# Patient Record
Sex: MALE | Race: WHITE | NOT HISPANIC OR LATINO | Employment: FULL TIME | URBAN - METROPOLITAN AREA
[De-identification: names, ages, dates, MRNs, and addresses within clinical notes are randomized per-mention and may not be internally consistent; named-entity substitution may affect disease eponyms.]

---

## 2023-08-15 ENCOUNTER — OFFICE VISIT (OUTPATIENT)
Dept: PEDIATRICS | Facility: CLINIC | Age: 18
End: 2023-08-15
Payer: COMMERCIAL

## 2023-08-15 VITALS — BODY MASS INDEX: 36.75 KG/M2 | HEART RATE: 76 BPM | RESPIRATION RATE: 16 BRPM | TEMPERATURE: 96.7 F | WEIGHT: 256.13 LBS

## 2023-08-15 DIAGNOSIS — R29.898 WEAKNESS OF BOTH HANDS: Primary | ICD-10-CM

## 2023-08-15 PROCEDURE — 99214 OFFICE O/P EST MOD 30 MIN: CPT | Performed by: PEDIATRICS

## 2023-08-15 NOTE — PROGRESS NOTES
Subjective   Patient ID: Rio Stone is a 18 y.o. male who presents for Hand Pain.  Patient is present in office with Mom for hand problems,   Several yrs of bilat hand weakness intermittent. Per mom seemed to happen after broken wrists when younger. Occ will drops tools or cups. Has intermittent pain in differnet areas of hands. Today pointing to right radial aspect.    Mom has hand surgeon for carpal tunnel    Hand Pain   Incident onset: 4 yrs ago/ last flared yesterday. The incident occurred at work. The pain is at a severity of 8/10. The pain is moderate. The pain has been Constant since the incident.       Review of Systems    Objective   Physical Exam  Vitals reviewed.   Constitutional:       Appearance: Normal appearance.   Musculoskeletal:      Comments: Diminished  strength on right in right handed person.   Neurological:      Mental Status: He is alert.      Comments: Unable to detect light and sharp sensation on left thumb and left radial aspect forearm         Assessment/Plan   Diagnoses and all orders for this visit:  Weakness of both hands  -     Referral to Orthopaedic Surgery; Future    May take motin for pain and consider ice on wrists as needed    Call if worsens
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
## 2023-10-10 PROBLEM — R41.840 POOR CONCENTRATION: Status: ACTIVE | Noted: 2023-10-10

## 2023-10-10 PROBLEM — R63.5 WEIGHT GAIN: Status: ACTIVE | Noted: 2023-10-10

## 2023-10-10 PROBLEM — F81.9 LEARNING DISABILITY: Status: ACTIVE | Noted: 2017-04-03

## 2023-10-10 PROBLEM — F98.8 ATTENTION DEFICIT DISORDER WITHOUT HYPERACTIVITY: Status: ACTIVE | Noted: 2023-10-10

## 2023-10-10 PROBLEM — G43.101 MIGRAINE WITH AURA AND WITH STATUS MIGRAINOSUS, NOT INTRACTABLE: Status: ACTIVE | Noted: 2019-07-11

## 2023-10-10 PROBLEM — G56.02 CARPAL TUNNEL SYNDROME OF LEFT WRIST: Status: ACTIVE | Noted: 2023-10-10

## 2023-10-10 PROBLEM — S40.012A CONTUSION OF LEFT SHOULDER: Status: ACTIVE | Noted: 2023-10-10

## 2023-10-10 PROBLEM — L30.9 ECZEMA: Status: ACTIVE | Noted: 2023-10-10

## 2023-10-10 PROBLEM — G43.009 MIGRAINE WITHOUT AURA AND WITHOUT STATUS MIGRAINOSUS, NOT INTRACTABLE: Status: ACTIVE | Noted: 2023-10-10

## 2023-10-10 PROBLEM — Z15.89: Status: ACTIVE | Noted: 2017-04-03

## 2023-10-10 PROBLEM — F81.9 LEARNING DIFFICULTY: Status: ACTIVE | Noted: 2023-10-10

## 2023-10-10 PROBLEM — L85.8 OTHER SPECIFIED EPIDERMAL THICKENING: Status: ACTIVE | Noted: 2018-01-24

## 2023-10-10 PROBLEM — G91.9 HYDROCEPHALUS (MULTI): Status: ACTIVE | Noted: 2023-10-10

## 2023-10-10 PROBLEM — L85.3 XEROSIS CUTIS: Status: ACTIVE | Noted: 2018-01-24

## 2023-10-10 PROBLEM — G93.89 CEREBRAL VENTRICULOMEGALY: Status: ACTIVE | Noted: 2023-10-10

## 2023-10-10 PROBLEM — G56.01 CARPAL TUNNEL SYNDROME OF RIGHT WRIST: Status: ACTIVE | Noted: 2023-10-10

## 2023-10-10 PROBLEM — T50.905A MEDICATION REACTION: Status: ACTIVE | Noted: 2023-10-10

## 2023-10-10 PROBLEM — F41.9 ANXIETY: Status: ACTIVE | Noted: 2019-12-11

## 2023-10-10 PROBLEM — R51.9 CHRONIC DAILY HEADACHE: Status: ACTIVE | Noted: 2019-07-11

## 2023-10-10 RX ORDER — EPINEPHRINE 0.3 MG/.3ML
0.3 INJECTION SUBCUTANEOUS ONCE AS NEEDED
COMMUNITY
Start: 2016-07-13 | End: 2024-03-14 | Stop reason: SDUPTHER

## 2023-10-10 RX ORDER — AMMONIUM LACTATE 12 G/100G
1 LOTION TOPICAL
COMMUNITY
Start: 2018-01-24

## 2023-10-10 RX ORDER — ONDANSETRON HYDROCHLORIDE 8 MG/1
1 TABLET, FILM COATED ORAL EVERY 8 HOURS PRN
COMMUNITY
Start: 2020-04-28

## 2023-10-10 RX ORDER — SUMATRIPTAN 50 MG/1
1 TABLET, FILM COATED ORAL AS NEEDED
COMMUNITY
Start: 2020-04-28

## 2023-10-10 RX ORDER — DULOXETIN HYDROCHLORIDE 20 MG/1
1 CAPSULE, DELAYED RELEASE ORAL DAILY
COMMUNITY
Start: 2020-08-06

## 2023-10-11 ENCOUNTER — OFFICE VISIT (OUTPATIENT)
Dept: ORTHOPEDIC SURGERY | Facility: CLINIC | Age: 18
End: 2023-10-11
Payer: COMMERCIAL

## 2023-10-11 VITALS — HEIGHT: 70 IN | BODY MASS INDEX: 36.65 KG/M2 | WEIGHT: 256 LBS

## 2023-10-11 DIAGNOSIS — G56.02 CARPAL TUNNEL SYNDROME OF LEFT WRIST: Primary | ICD-10-CM

## 2023-10-11 DIAGNOSIS — G56.01 CARPAL TUNNEL SYNDROME OF RIGHT WRIST: ICD-10-CM

## 2023-10-11 PROCEDURE — 99214 OFFICE O/P EST MOD 30 MIN: CPT | Performed by: ORTHOPAEDIC SURGERY

## 2023-10-11 NOTE — PROGRESS NOTES
DEVONTE RAMSAY is a 18 year male who follows up today with bilateral hand pain, numbness, tingling, weakness. Patient reports dropping things lately and getting pain and numbness in fingers. Reports no past surgeries, injections, or trauma to the area. Pain worse with use, better with rest. Pain numb and tingly. Works as a . Got EMG.  Left worse than right.  Drops a lot of tools at work.  Worse first thing in the morning.      Review of Systems    Constitutional: no fever, no chills, not feeling tired, no recent weight gain and no recent weight loss.   ENT: no nosebleeds.   Cardiovascular: no chest pain.   Respiratory: no shortness of breath and no cough.   Gastrointestinal: no abdominal pain, no nausea, no vomiting and no diarrhea.   Musculoskeletal: per HPI  Integumentary: no rashes and no skin wound.   Neurological: no headache.   Psychiatric: no depression and no sleep disturbances.   Endocrine: no muscle weakness and no muscle cramps.   Hematologic/Lymphatic: no swollen glands and no tendency for easy bruising.     All other systems have been reviewed and are negative for complaint    Patient's past medical history, past surgical history, allergies, and medications have been reviewed unless otherwise noted in the chart.     Carpal Tunnel Exam  Inspection:  no evidence of infection, no edema, no erythema, no ecchymosis, Palpation:  compartments are soft, no pain with palpation, Range of Motion:  full wrist and finger range of motion, Stability:  no wrist instability detected, Strength:  5/5 APB and intrinsics, Skin:  intact, Vascular:  capillary refill <2 seconds distally, Sensation:  decreased in the median nerve distribution, Test:  negative Tinel's at the Carpal Tunnel, positive Direct Carpal Tunnel Compression Test      Constitutional   General appearance: Alert and in no acute distress. Well developed, well nourished.    Eyes   External Eye, Conjunctiva and lids: Normal external exam - pupils were  equal in size, round, reactive to light (PERRL) with normal accommodation and extraocular movements intact (EOMI).   Ears, Nose, Mouth, and Throat   Hearing: Normal.   Neck   Neck: No neck mass was observed. Supple.   Pulmonary   Respiratory effort: No respiratory distress.   Cardiovascular   Examination of extremities: No peripheral edema.   Abdomen   Abdomen: Soft nontender; no abdominal mass palpated.. No rebound, rigidity or guarding.    Skin   Skin and subcutaneous tissue: Normal skin color and pigmentation, normal skin turgor, and no rash.   Neurologic   Sensation: Normal.   Psychiatric   Judgment and insight: Intact.   Orientation to person, place, and time: Alert and oriented x 3.       Mood and affect: Normal.      EMG - borderline right CTS, no left     Bilateral CTS  I discussed the diagnosis and treatment options with the patient today along with their associated risks and benefits. After thorough discussion, the patient has elected to proceed with conservative management. All questions were answered to the patients satisfaction who seems satisfied with the plan.      Discussion I discussed the diagnosis and treatment options with the patient today along with their associated risks and benefits. After thorough discussion, the patient has elected to proceed with a corticosteroid injection with Kenalog and Xylocaine, which was performed in the office today under aseptic technique and the patient tolerated the procedure well.          Ortho Injections:           Injection site left carpal tunnel. Medication 1 cc 1% Xylocaine, 1 cc 10 mg Kenalog, Injection given under sterile conditions. No immediate complications noted. Post injection instructions given.  FU 8 weeks prn - NO XR

## 2024-02-20 ENCOUNTER — HOSPITAL ENCOUNTER (EMERGENCY)
Facility: HOSPITAL | Age: 19
Discharge: HOME | End: 2024-02-20
Payer: COMMERCIAL

## 2024-02-20 VITALS
BODY MASS INDEX: 33.86 KG/M2 | SYSTOLIC BLOOD PRESSURE: 144 MMHG | TEMPERATURE: 97.6 F | OXYGEN SATURATION: 96 % | RESPIRATION RATE: 16 BRPM | HEIGHT: 72 IN | HEART RATE: 92 BPM | WEIGHT: 250 LBS | DIASTOLIC BLOOD PRESSURE: 80 MMHG

## 2024-02-20 PROCEDURE — 4500999001 HC ED NO CHARGE

## 2024-02-20 ASSESSMENT — PAIN SCALES - GENERAL: PAINLEVEL_OUTOF10: 7

## 2024-02-20 ASSESSMENT — PAIN DESCRIPTION - ORIENTATION: ORIENTATION: LEFT

## 2024-02-20 ASSESSMENT — PAIN - FUNCTIONAL ASSESSMENT: PAIN_FUNCTIONAL_ASSESSMENT: 0-10

## 2024-02-20 ASSESSMENT — PAIN DESCRIPTION - LOCATION: LOCATION: ARM

## 2024-03-11 ENCOUNTER — PREP FOR PROCEDURE (OUTPATIENT)
Dept: ORTHOPEDIC SURGERY | Facility: CLINIC | Age: 19
End: 2024-03-11

## 2024-03-11 ENCOUNTER — OFFICE VISIT (OUTPATIENT)
Dept: ORTHOPEDIC SURGERY | Facility: CLINIC | Age: 19
End: 2024-03-11
Payer: COMMERCIAL

## 2024-03-11 DIAGNOSIS — G56.02 CARPAL TUNNEL SYNDROME OF LEFT WRIST: Primary | ICD-10-CM

## 2024-03-11 DIAGNOSIS — G56.01 CARPAL TUNNEL SYNDROME OF RIGHT WRIST: ICD-10-CM

## 2024-03-11 DIAGNOSIS — G56.02 CARPAL TUNNEL SYNDROME OF LEFT WRIST: ICD-10-CM

## 2024-03-11 PROCEDURE — 99214 OFFICE O/P EST MOD 30 MIN: CPT | Performed by: ORTHOPAEDIC SURGERY

## 2024-03-11 NOTE — H&P (VIEW-ONLY)
DEVONTE RAMSAY is a 18 year male who follows up today with bilateral hand pain, numbness, tingling, weakness. Patient reports dropping things lately and getting pain and numbness in fingers. Reports no past surgeries or trauma to the area. Pain worse with use, better with rest. Pain numb and tingly. Works as a . Got EMG.  Left worse than right.  Drops a lot of tools at work.  Worse first thing in the morning.  Had a shot in his left about 5 months ago that gave him relief until about a month ago.  Tried some splints which did not help much .  States he would like surgery.  He would also like to try a shot in his right at the time of surgery    Review of Systems    Constitutional: no fever, no chills, not feeling tired, no recent weight gain and no recent weight loss.   ENT: no nosebleeds.   Cardiovascular: no chest pain.   Respiratory: no shortness of breath and no cough.   Gastrointestinal: no abdominal pain, no nausea, no vomiting and no diarrhea.   Musculoskeletal: per HPI  Integumentary: no rashes and no skin wound.   Neurological: no headache.   Psychiatric: no depression and no sleep disturbances.   Endocrine: no muscle weakness and no muscle cramps.   Hematologic/Lymphatic: no swollen glands and no tendency for easy bruising.     All other systems have been reviewed and are negative for complaint    Patient's past medical history, past surgical history, allergies, and medications have been reviewed unless otherwise noted in the chart.     Carpal Tunnel Exam  Inspection:  no evidence of infection, no edema, no erythema, no ecchymosis, Palpation:  compartments are soft, no pain with palpation, Range of Motion:  full wrist and finger range of motion, Stability:  no wrist instability detected, Strength:  5/5 APB and intrinsics, Skin:  intact, Vascular:  capillary refill <2 seconds distally, Sensation:  decreased in the median nerve distribution, Test:  negative Tinel's at the Carpal Tunnel, positive Direct  Carpal Tunnel Compression Test      Constitutional   General appearance: Alert and in no acute distress. Well developed, well nourished.    Eyes   External Eye, Conjunctiva and lids: Normal external exam - pupils were equal in size, round, reactive to light (PERRL) with normal accommodation and extraocular movements intact (EOMI).   Ears, Nose, Mouth, and Throat   Hearing: Normal.   Neck   Neck: No neck mass was observed. Supple.   Pulmonary   Respiratory effort: No respiratory distress.   Auscultation of lungs: Clear bilateral breath sounds.   Cardiovascular   Examination of extremities: No peripheral edema.   Auscultation of heart: Heart rate and rhythm were normal   Psychiatric   Judgment and insight: Intact.   Orientation to person, place, and time: Alert and oriented x 3.       Mood and affect: Normal.      EMG - borderline right CTS, no left     Bilateral CTS  Surgery discussion: I discussed the diagnosis and treatment options with the patient today along with their associated risks and benefits. After thorough discussion, the patient has elected to proceed with surgical intervention. The patient understands the risks of,including, but not limited to, bleeding, infection, loss of life or limb, pain, permanent numbness, tingling, weakness, loss of motion, failure of the goals of surgery or the potential need for additional surgery. The patient would like to accept these risks and proceed. All questions were answered to the patients satisfaction and the patient seems satisfied with the plan.    Plan left E CTR with right steroid injection carpal tunnel -patient has shown improvement in symptoms with a steroid injection in his left and therefore surgery is a reasonable option and he would like to proceed, braces are not helping him enough anymore  Follow-up 2 weeks after

## 2024-03-11 NOTE — PROGRESS NOTES
Bilateral carpal tunnel 10/11/2023 left injection done, helped for 4 months   Patient would like another injection

## 2024-03-13 ENCOUNTER — TELEMEDICINE CLINICAL SUPPORT (OUTPATIENT)
Dept: PREADMISSION TESTING | Facility: HOSPITAL | Age: 19
End: 2024-03-13
Payer: COMMERCIAL

## 2024-03-13 NOTE — PREPROCEDURE INSTRUCTIONS
No outpatient medications have been marked as taking for the 3/13/24 encounter (Telemedicine Clinical Support) with [facility] ROOM 01.              NPO Instructions:     Do not drink any liquid after midnight the night before your surgery  Do not eat any food after midnight the night before your surgery/procedure.  Candy, gum, mints and smoking of cigarettes, marijuana or vaping is not permitted after midnight prior to your surgery.  Do not chew tobacco morning of surgery.   Do not drink Alcohol 24 hours prior to surgery      Increase fluid intake day before surgery    Additional Instructions:      Review your medication instructions, take indicated medications    Wear  comfortable loose fitting clothing  Do not use moisturizers, creams, lotions or perfume  All jewelry and valuables should be left at home. May bring glasses and partials.    Stop blood thinning medications as instructed by ordering physician or surgeon.  Stop Ibuprofen and Aspirin products 7 days prior to surgery.     Shower or bathe the night before or day of surgery.   Brush teeth and avoid perfumes, colognes, powders, makeup, aftershave and hair spray    Go to Registration, in the main lobby, upon arrival on the day of surgery and have 's license and medical insurance card available.    Call [phone] the day before your surgery/procedure to find out what time you are to arrive the next day.     Please have a responsible adult to drive you home and be available to help you as needed after surgery.         NPO Instructions:    Do not eat any food after midnight the night before your surgery/procedure.

## 2024-03-14 DIAGNOSIS — Z91.030 BEE STING ALLERGY: Primary | ICD-10-CM

## 2024-03-18 RX ORDER — EPINEPHRINE 0.3 MG/.3ML
0.3 INJECTION SUBCUTANEOUS ONCE AS NEEDED
Qty: 2 EACH | Refills: 1 | Status: SHIPPED | OUTPATIENT
Start: 2024-03-18

## 2024-03-20 ENCOUNTER — ANESTHESIA EVENT (OUTPATIENT)
Dept: OPERATING ROOM | Facility: HOSPITAL | Age: 19
End: 2024-03-20
Payer: COMMERCIAL

## 2024-03-26 ENCOUNTER — HOSPITAL ENCOUNTER (OUTPATIENT)
Facility: HOSPITAL | Age: 19
Setting detail: OUTPATIENT SURGERY
Discharge: HOME | End: 2024-03-26
Attending: ORTHOPAEDIC SURGERY | Admitting: ORTHOPAEDIC SURGERY
Payer: COMMERCIAL

## 2024-03-26 ENCOUNTER — PHARMACY VISIT (OUTPATIENT)
Dept: PHARMACY | Facility: CLINIC | Age: 19
End: 2024-03-26
Payer: MEDICAID

## 2024-03-26 ENCOUNTER — ANESTHESIA (OUTPATIENT)
Dept: OPERATING ROOM | Facility: HOSPITAL | Age: 19
End: 2024-03-26
Payer: COMMERCIAL

## 2024-03-26 VITALS
HEART RATE: 79 BPM | SYSTOLIC BLOOD PRESSURE: 110 MMHG | OXYGEN SATURATION: 96 % | DIASTOLIC BLOOD PRESSURE: 61 MMHG | RESPIRATION RATE: 16 BRPM | TEMPERATURE: 98.5 F

## 2024-03-26 DIAGNOSIS — G56.02 CARPAL TUNNEL SYNDROME OF LEFT WRIST: Primary | ICD-10-CM

## 2024-03-26 DIAGNOSIS — G56.01 CARPAL TUNNEL SYNDROME OF RIGHT WRIST: ICD-10-CM

## 2024-03-26 PROCEDURE — 7100000009 HC PHASE TWO TIME - INITIAL BASE CHARGE: Performed by: ORTHOPAEDIC SURGERY

## 2024-03-26 PROCEDURE — 2500000004 HC RX 250 GENERAL PHARMACY W/ HCPCS (ALT 636 FOR OP/ED): Performed by: ANESTHESIOLOGY

## 2024-03-26 PROCEDURE — 3600000008 HC OR TIME - EACH INCREMENTAL 1 MINUTE - PROCEDURE LEVEL THREE: Performed by: ORTHOPAEDIC SURGERY

## 2024-03-26 PROCEDURE — RXMED WILLOW AMBULATORY MEDICATION CHARGE

## 2024-03-26 PROCEDURE — 29848 WRIST ENDOSCOPY/SURGERY: CPT | Performed by: ORTHOPAEDIC SURGERY

## 2024-03-26 PROCEDURE — 20526 THER INJECTION CARP TUNNEL: CPT | Performed by: ORTHOPAEDIC SURGERY

## 2024-03-26 PROCEDURE — 2500000005 HC RX 250 GENERAL PHARMACY W/O HCPCS: Performed by: ORTHOPAEDIC SURGERY

## 2024-03-26 PROCEDURE — 2500000004 HC RX 250 GENERAL PHARMACY W/ HCPCS (ALT 636 FOR OP/ED): Performed by: NURSE ANESTHETIST, CERTIFIED REGISTERED

## 2024-03-26 PROCEDURE — 7100000010 HC PHASE TWO TIME - EACH INCREMENTAL 1 MINUTE: Performed by: ORTHOPAEDIC SURGERY

## 2024-03-26 PROCEDURE — 2720000007 HC OR 272 NO HCPCS: Performed by: ORTHOPAEDIC SURGERY

## 2024-03-26 PROCEDURE — 3600000003 HC OR TIME - INITIAL BASE CHARGE - PROCEDURE LEVEL THREE: Performed by: ORTHOPAEDIC SURGERY

## 2024-03-26 PROCEDURE — 2500000004 HC RX 250 GENERAL PHARMACY W/ HCPCS (ALT 636 FOR OP/ED): Performed by: ORTHOPAEDIC SURGERY

## 2024-03-26 PROCEDURE — 3700000002 HC GENERAL ANESTHESIA TIME - EACH INCREMENTAL 1 MINUTE: Performed by: ORTHOPAEDIC SURGERY

## 2024-03-26 PROCEDURE — 3700000001 HC GENERAL ANESTHESIA TIME - INITIAL BASE CHARGE: Performed by: ORTHOPAEDIC SURGERY

## 2024-03-26 RX ORDER — FENTANYL CITRATE 50 UG/ML
INJECTION, SOLUTION INTRAMUSCULAR; INTRAVENOUS AS NEEDED
Status: DISCONTINUED | OUTPATIENT
Start: 2024-03-26 | End: 2024-03-26

## 2024-03-26 RX ORDER — ONDANSETRON HYDROCHLORIDE 2 MG/ML
INJECTION, SOLUTION INTRAVENOUS AS NEEDED
Status: DISCONTINUED | OUTPATIENT
Start: 2024-03-26 | End: 2024-03-26

## 2024-03-26 RX ORDER — LABETALOL HYDROCHLORIDE 5 MG/ML
5 INJECTION, SOLUTION INTRAVENOUS ONCE AS NEEDED
Status: DISCONTINUED | OUTPATIENT
Start: 2024-03-26 | End: 2024-03-26 | Stop reason: HOSPADM

## 2024-03-26 RX ORDER — LIDOCAINE HYDROCHLORIDE AND EPINEPHRINE 20; 10 MG/ML; UG/ML
INJECTION, SOLUTION INFILTRATION; PERINEURAL AS NEEDED
Status: DISCONTINUED | OUTPATIENT
Start: 2024-03-26 | End: 2024-03-26 | Stop reason: HOSPADM

## 2024-03-26 RX ORDER — LIDOCAINE HYDROCHLORIDE 10 MG/ML
0.1 INJECTION, SOLUTION EPIDURAL; INFILTRATION; INTRACAUDAL; PERINEURAL ONCE
Status: DISCONTINUED | OUTPATIENT
Start: 2024-03-26 | End: 2024-03-26 | Stop reason: HOSPADM

## 2024-03-26 RX ORDER — KETOROLAC TROMETHAMINE 30 MG/ML
INJECTION, SOLUTION INTRAMUSCULAR; INTRAVENOUS AS NEEDED
Status: DISCONTINUED | OUTPATIENT
Start: 2024-03-26 | End: 2024-03-26

## 2024-03-26 RX ORDER — TRIAMCINOLONE ACETONIDE 40 MG/ML
INJECTION, SUSPENSION INTRA-ARTICULAR; INTRAMUSCULAR AS NEEDED
Status: DISCONTINUED | OUTPATIENT
Start: 2024-03-26 | End: 2024-03-26 | Stop reason: HOSPADM

## 2024-03-26 RX ORDER — PROPOFOL 10 MG/ML
INJECTION, EMULSION INTRAVENOUS CONTINUOUS PRN
Status: DISCONTINUED | OUTPATIENT
Start: 2024-03-26 | End: 2024-03-26

## 2024-03-26 RX ORDER — OXYCODONE AND ACETAMINOPHEN 5; 325 MG/1; MG/1
1 TABLET ORAL EVERY 4 HOURS PRN
Status: DISCONTINUED | OUTPATIENT
Start: 2024-03-26 | End: 2024-03-26 | Stop reason: HOSPADM

## 2024-03-26 RX ORDER — SODIUM CHLORIDE, SODIUM LACTATE, POTASSIUM CHLORIDE, CALCIUM CHLORIDE 600; 310; 30; 20 MG/100ML; MG/100ML; MG/100ML; MG/100ML
100 INJECTION, SOLUTION INTRAVENOUS CONTINUOUS
Status: DISCONTINUED | OUTPATIENT
Start: 2024-03-26 | End: 2024-03-26 | Stop reason: HOSPADM

## 2024-03-26 RX ORDER — BUPIVACAINE HCL/EPINEPHRINE 0.5-1:200K
VIAL (ML) INJECTION AS NEEDED
Status: DISCONTINUED | OUTPATIENT
Start: 2024-03-26 | End: 2024-03-26 | Stop reason: HOSPADM

## 2024-03-26 RX ORDER — ALBUTEROL SULFATE 0.83 MG/ML
2.5 SOLUTION RESPIRATORY (INHALATION) ONCE AS NEEDED
Status: DISCONTINUED | OUTPATIENT
Start: 2024-03-26 | End: 2024-03-26 | Stop reason: HOSPADM

## 2024-03-26 RX ORDER — HYDRALAZINE HYDROCHLORIDE 20 MG/ML
5 INJECTION INTRAMUSCULAR; INTRAVENOUS EVERY 30 MIN PRN
Status: DISCONTINUED | OUTPATIENT
Start: 2024-03-26 | End: 2024-03-26 | Stop reason: HOSPADM

## 2024-03-26 RX ORDER — MIDAZOLAM HYDROCHLORIDE 1 MG/ML
INJECTION, SOLUTION INTRAMUSCULAR; INTRAVENOUS AS NEEDED
Status: DISCONTINUED | OUTPATIENT
Start: 2024-03-26 | End: 2024-03-26

## 2024-03-26 RX ORDER — ONDANSETRON HYDROCHLORIDE 2 MG/ML
4 INJECTION, SOLUTION INTRAVENOUS ONCE AS NEEDED
Status: DISCONTINUED | OUTPATIENT
Start: 2024-03-26 | End: 2024-03-26 | Stop reason: HOSPADM

## 2024-03-26 RX ORDER — HYDROCODONE BITARTRATE AND ACETAMINOPHEN 5; 325 MG/1; MG/1
1 TABLET ORAL EVERY 6 HOURS PRN
Qty: 10 TABLET | Refills: 0 | Status: SHIPPED | OUTPATIENT
Start: 2024-03-26

## 2024-03-26 RX ORDER — CEFAZOLIN SODIUM 2 G/100ML
INJECTION, SOLUTION INTRAVENOUS AS NEEDED
Status: DISCONTINUED | OUTPATIENT
Start: 2024-03-26 | End: 2024-03-26

## 2024-03-26 RX ORDER — PROPOFOL 10 MG/ML
INJECTION, EMULSION INTRAVENOUS AS NEEDED
Status: DISCONTINUED | OUTPATIENT
Start: 2024-03-26 | End: 2024-03-26

## 2024-03-26 RX ORDER — MEPERIDINE HYDROCHLORIDE 25 MG/ML
12.5 INJECTION INTRAMUSCULAR; INTRAVENOUS; SUBCUTANEOUS EVERY 10 MIN PRN
Status: DISCONTINUED | OUTPATIENT
Start: 2024-03-26 | End: 2024-03-26 | Stop reason: HOSPADM

## 2024-03-26 RX ORDER — FAMOTIDINE 10 MG/ML
20 INJECTION INTRAVENOUS ONCE
Status: COMPLETED | OUTPATIENT
Start: 2024-03-26 | End: 2024-03-26

## 2024-03-26 RX ORDER — DIPHENHYDRAMINE HYDROCHLORIDE 50 MG/ML
12.5 INJECTION INTRAMUSCULAR; INTRAVENOUS ONCE AS NEEDED
Status: DISCONTINUED | OUTPATIENT
Start: 2024-03-26 | End: 2024-03-26 | Stop reason: HOSPADM

## 2024-03-26 RX ORDER — MORPHINE SULFATE 2 MG/ML
2 INJECTION, SOLUTION INTRAMUSCULAR; INTRAVENOUS EVERY 5 MIN PRN
Status: DISCONTINUED | OUTPATIENT
Start: 2024-03-26 | End: 2024-03-26 | Stop reason: HOSPADM

## 2024-03-26 RX ORDER — DROPERIDOL 2.5 MG/ML
0.62 INJECTION, SOLUTION INTRAMUSCULAR; INTRAVENOUS ONCE AS NEEDED
Status: DISCONTINUED | OUTPATIENT
Start: 2024-03-26 | End: 2024-03-26 | Stop reason: HOSPADM

## 2024-03-26 RX ADMIN — FAMOTIDINE 20 MG: 10 INJECTION, SOLUTION INTRAVENOUS at 08:18

## 2024-03-26 RX ADMIN — PROPOFOL 100 MCG/KG/MIN: 10 INJECTION, EMULSION INTRAVENOUS at 09:42

## 2024-03-26 RX ADMIN — CEFAZOLIN SODIUM 2 G: 2 INJECTION, SOLUTION INTRAVENOUS at 09:39

## 2024-03-26 RX ADMIN — ONDANSETRON 4 MG: 2 INJECTION INTRAMUSCULAR; INTRAVENOUS at 09:54

## 2024-03-26 RX ADMIN — SODIUM CHLORIDE, POTASSIUM CHLORIDE, SODIUM LACTATE AND CALCIUM CHLORIDE 100 ML/HR: 600; 310; 30; 20 INJECTION, SOLUTION INTRAVENOUS at 08:19

## 2024-03-26 RX ADMIN — KETOROLAC TROMETHAMINE 30 MG: 30 INJECTION, SOLUTION INTRAMUSCULAR at 09:53

## 2024-03-26 RX ADMIN — PROPOFOL 50 MG: 10 INJECTION, EMULSION INTRAVENOUS at 09:42

## 2024-03-26 RX ADMIN — FENTANYL CITRATE 50 MCG: 50 INJECTION INTRAMUSCULAR; INTRAVENOUS at 09:39

## 2024-03-26 RX ADMIN — MIDAZOLAM 2 MG: 1 INJECTION INTRAMUSCULAR; INTRAVENOUS at 09:39

## 2024-03-26 RX ADMIN — PROPOFOL 15 MG: 10 INJECTION, EMULSION INTRAVENOUS at 09:43

## 2024-03-26 SDOH — HEALTH STABILITY: MENTAL HEALTH: CURRENT SMOKER: 1

## 2024-03-26 ASSESSMENT — PAIN - FUNCTIONAL ASSESSMENT: PAIN_FUNCTIONAL_ASSESSMENT: 0-10

## 2024-03-26 ASSESSMENT — COLUMBIA-SUICIDE SEVERITY RATING SCALE - C-SSRS
6. HAVE YOU EVER DONE ANYTHING, STARTED TO DO ANYTHING, OR PREPARED TO DO ANYTHING TO END YOUR LIFE?: NO
2. HAVE YOU ACTUALLY HAD ANY THOUGHTS OF KILLING YOURSELF?: NO
1. IN THE PAST MONTH, HAVE YOU WISHED YOU WERE DEAD OR WISHED YOU COULD GO TO SLEEP AND NOT WAKE UP?: NO

## 2024-03-26 ASSESSMENT — PAIN SCALES - GENERAL: PAIN_LEVEL: 0

## 2024-03-26 NOTE — DISCHARGE INSTRUCTIONS
[facility]  [phone]   Fax: [phone] [address]   [address]  [address] [place], [place] [id number]    Upper Extremity - Endoscopic Carpal Tunnel Release left, right carpal tunnel steroid injection    1. Dressing    You have a soft bandage over the operative site.  DO NOT GET DRESSING WET! Once at home, if your dressing feels too tight or mistakenly gets wet, please contact the office. This bandage can be removed in 48 hours and replaced in with Band-Aids as needed. After 48 hours you may wash your hands and shower, but no submerging.    If your sutures are visible (black strings), they will be removed about 10-14 days after surgery.  You should make an appointment to see your physician 10-14 after surgery.    2. Activity     Most people underestimate the length of time required to fully recover from surgery.  It is recommended you take some pain medication the evening following your surgery so when the local anesthetic wears off (in the middle of the night), you are not in severe pain.   With pain medication, start at the lowest dose that controls your pain.       After the first 1-3 days, we encourage you to balance your activity between ambulation, sitting in a chair, and resting in bed with your arm elevated. Let swelling and pain be your guide to activity, you should avoid prolonged (over 20 minutes) standing or sitting. In general, limit your activities to home for the first 1-3 days.    3. Pain    You will be discharged to home with a prescription for oral pain medication. A most important factor in pain control is rest and elevation. Ice may also be applied to the operative site for 30 minute intervals. Please use a waterproof bag for ice or cold packs.  Again, as you feel the numbness resolving and some onset of discomfort, please take pain medication, don't wait till full resolution of block.   Try to use the lowest dose  of pain medication that controls your pain.      The pain medication may cause constipation. Drink plenty of fluids, eat fruits and vegetables. You may use an over the counter laxative or stool softener if necessary. Take pain medication with some food in your stomach, as prescribed by your pharmacist.     4. Elevation     Elevation of the operative extremity is critical. Elevation reduces swelling and minimizes pain. Less swelling is associated with a lower infectious rate, fewer wound complications, less post-operative stiffness, and more rapid recovery of function. To keep the swelling down, your hand must be kept above the level of your heart.

## 2024-03-26 NOTE — OP NOTE
ORTHOPEDIC OPERATIVE NOTE    Name: Rio Stone  : 2005  Surgeon: Ralph Moore DO  Facility: [facility]  Date of Surgery: 24  ORTHOPEDIC OPERATIVE NOTE    SURGEON:     Ralph Moore DO  PRE OP DIAGNOSIS:  Carpal Tunnel Syndrome bilateral Wrist  POST OP DIAGNOSIS:  Same  PROCEDURE:   Endoscopic Carpal Tunnel Release left Wrist, steroid injection right carpal tunnel  ANESTHESIA:  Local with sedation  ASSISTANT:    SA Christopher  COMPLICATIONS:   None.  CONDITION:    Satisfactory to PACU.  BLOOD LOSS: Minimal    INDICATION FOR PROCEDURE: The patient is a 18 y.o. -year-old male who has failed nonsurgical management for left carpal tunnel syndrome including night splints. They had an extensive workup consisting of signs, symptoms, and clinical history of EMG nerve conduction study consistent with left carpal syndrome.  The patient was seen in the office, fully informed risks and benefits of the procedure, and elected to undergo the procedure.    PROCEDURE IN DETAIL: The patient was seen and consented preoperatively with side and site of surgery appropriately marked. The patient was taken back to the operative suite, placed supine on the operative table, and placed on monitor for the duration of case. The patient was administered sedation and monitored throughout the entire surgery by Department of Anesthesia. While sedated, the patient was administered 5 cc of mixed marcaine and lidocaine to the volar aspect of wrist and palm for local anesthesia. The operative upper extremity was then sterilely prepped and draped in sterile orthopedic fashion, elevated with an Esmarch, and tourniquet inflated to 250 mmHg for the duration of case, and time-out was performed confirming site of surgery and surgery to be performed.    A 1-cm transverse incision was made to the ulnar aspect of the palmaris longus at proximal wrist crease. Skin and underlying tissues were sharply dissected down. Hemostasis maintained  with bipolar electrocauterization. Distally based flap of the fascia overlying the median nerve was then released, and under direct visualization, proximal fascia was released with Littler scissors. The elevator and dilator were then placed in the carpal tunnel with appropriate ease of passage and corrugated feel of the undersurface of transcarpal ligament. The endoscope was then placed under direct visualization. The transverse carpal ligament was released in its entirety, confirmed both visually as well as by utilizing endoscope as a probe, which freely passed following release. The nerve was evaluated. No evidence of lesion or adhesion was present.    Under sterile technique I injected 1 cc lidocaine and 10 mg Kenalog into the right carpal tunnel.  Band-Aid was placed.    The wound was irrigated with sterile saline, closed with 5-0 nylon suture. Sterile dressing was then placed of Xeroform and 4x4s affixed with Kerlix and Ace wrap. Tourniquet was deflated, and the patient was taken to PACU in satisfactory condition.    Electronically signed  Ralph Moore DO

## 2024-03-26 NOTE — ANESTHESIA POSTPROCEDURE EVALUATION
Patient: Rio Stone    Procedure Summary       Date: 03/26/24 Room / Location: [facility] OR 07 / Virtual [facility] OR    Anesthesia Start: 0939 Anesthesia Stop: 1002    Procedure: LEFT ENDOSCOPIC CARPAL TUNNEL RELEASE WITH A RIGHT CARPAL TUNNEL STERIOD INJECTION (MAC/LOCAL) (Left: Wrist) Diagnosis:       Carpal tunnel syndrome of left wrist      Carpal tunnel syndrome of right wrist      (Carpal tunnel syndrome of left wrist [G56.02])      (Carpal tunnel syndrome of right wrist [G56.01])    Surgeons: Smith Moore DO Responsible Provider: IBETH Sosa    Anesthesia Type: MAC ASA Status: 2            Anesthesia Type: MAC    Vitals Value Taken Time   /61 03/26/24 1041   Temp 36.9 °C (98.5 °F) 03/26/24 1040   Pulse 73 03/26/24 1044   Resp 14 03/26/24 1045   SpO2 92 % 03/26/24 1046   Vitals shown include unvalidated device data.    Anesthesia Post Evaluation    Patient location during evaluation: PACU  Patient participation: complete - patient participated  Level of consciousness: awake  Pain score: 0  Pain management: adequate  Airway patency: patent  Cardiovascular status: acceptable  Respiratory status: acceptable  Hydration status: acceptable  Postoperative Nausea and Vomiting: none    There were no known notable events for this encounter.

## 2024-03-26 NOTE — ANESTHESIA PREPROCEDURE EVALUATION
Patient: Rio Stone    Procedure Information       Date/Time: 03/26/24 0930    Procedure: LEFT ENDOSCOPIC CARPAL TUNNEL RELEASE WITH A RIGHT CARPAL TUNNEL STERIOD INJECTION (MAC/LOCAL) (Left: Wrist) - LOCAL/MAC - 15 MINS - KENALOG    Location: [facility] OR 07 / Virtual [facility] OR    Surgeons: Smith Moore, DO            Relevant Problems   Anesthesia (within normal limits)      Endocrine   (+) BMI, pediatric > 99% for age      Neuro/Psych   (+) Anxiety   (+) Carpal tunnel syndrome of left wrist   (+) Carpal tunnel syndrome of right wrist   (+) Chronic daily headache      Musculoskeletal   (+) Carpal tunnel syndrome of left wrist   (+) Carpal tunnel syndrome of right wrist       Clinical information reviewed:   Tobacco  Allergies  Meds   Med Hx  Surg Hx   Fam Hx  Soc Hx        NPO Detail:  NPO/Void Status  Date of Last Liquid: 03/25/24  Date of Last Solid: 03/25/24         Physical Exam    Airway  Mallampati: II  TM distance: >3 FB  Neck ROM: full     Cardiovascular - normal exam     Dental - normal exam     Pulmonary - normal exam     Abdominal - normal exam         Anesthesia Plan    History of general anesthesia?: yes  History of complications of general anesthesia?: no    ASA 2     MAC     The patient is a current smoker.  Patient was previously instructed to abstain from smoking on day of procedure.  Patient did not smoke on day of procedure.    intravenous induction   Postoperative administration of opioids is intended.  Anesthetic plan and risks discussed with patient.  Use of blood products discussed with patient who.    Plan discussed with CRNA.   Subjective:  Debra Vyas is a 62 year old female who presents for the following issues:    Acute worsening of vomiting.   -7/22, woke in the middle of the night.  Wakes with gas, then passes gas, then started having emesis and diarrhea.  She had progressed to vomiting every 30-40 minutes.    -no hematemesis.   -Seen in the emergency department for this and attributed to being a viral GE, though the patient was in disagreement with this given her long-standing history of chronic GI issues, which are delineated below in this note in the assessment and plan section.    Some mild discomfort with urination, but no severe pain  Sensation of pressure with urination, occurs rarely  No hematuria noted  No fevers or chills-    Currently no vomiting, tolerating fluids and solids well.      DM II: Following with pharmacy med management clinic and working on improving her insulin management  2 episodes hypoglycemia - 44, ultimately returned back up to 100.   -took insulin without eating enough  -trying to line up eating and injection of insulin within 15 minutes.    Averages in 224 week, 210 2 weeks and 208 30 days.      HTN: no light headedness, checking BPs athome.  -BPs running 115 systolic  -no orthostasis.       Pertinent past medical, surgical, social and family history reviewed and updated in Epic, please refer to electronic audit trail.      ROS:   As per HPI.     Objective:  Visit Vitals  /58 (BP Location: [facility], Patient Position: Sitting, Cuff Size: Small Adult)   Pulse 81   Temp 97.2 °F (36.2 °C) (Tympanic)   Resp 16   Ht 5' (1.524 m)   Wt 48.3 kg   SpO2 97%   BMI 20.78 kg/m²       PHYSICAL EXAMINATION:  GEN:  Alert, no acute distress  HEENT: Extraocular movements intact, no scleral icterus, mucous membranes moist.  NECK: Supple, trachea midline.  CHEST/LUNG:  No respiratory distress, good air flow to bases, clear to auscultation bilaterally.  CV: S1 S2 regular rate and rhythm, no murmurs, rubs or  gallops.  NEURO:  Alert and oriented x3, gait normal, coordination intact, CN II-XII grossly intact.  PSYCH:  Mood and affect congruent.  SKIN: Hyper eratotic dark lesion, 3 mm diameter lateral right lower leg.      Plan for shave biopsy next visit    ASSESSMENT/PLAN:  Debra was seen today for er f/u.    Diagnoses and all orders for this visit:  HTN: Well-controlled today, denies orthostasis, advised on symptoms of orthostasis and to notify clinic if they occur continue current medications    Small intestinal bacterial overgrowth  Chronic diarrhea of unknown origin  -     SERVICE TO GASTROENTEROLOGY  Patient has followed with Dr. Santamaria with GI, having recently had endoscopic US of pancreas for dilated pancreatic duct and colonoscopy. Attempted treatments of SIBO have not been effective - she had a prescribed course of metronidazole without long standing improvement. -She had trial of combination treatment with me.  Most recently augmentin has not been tolerated due to vomiting (prescribed with GI).   -She has not had rifaximin to my knowledge, due to the medication not being covered by her insurance.   -She has had multiple colonoscopies, most recent being 2/2018.  They have demonstrated lactose intolerance, but otherwise have been negative.  She has had tubular adenomas x2 removed.       Adrenal adenoma  She has a history of adrenal adenoma that has been stable dating back to at least 2016, and had consultation with endocrine on that front given labile BPs,diarrhea and weight loss.  Work up at that time (2017) was negative, and endo at that time felt some of her weight loss may have been related to the uncontrolled DM.     FOLLOW UP:  Return in about 4 weeks (around 9/12/2019) for 1 hr visit - biopsy and follow up.

## 2024-04-08 ENCOUNTER — OFFICE VISIT (OUTPATIENT)
Dept: ORTHOPEDIC SURGERY | Facility: CLINIC | Age: 19
End: 2024-04-08
Payer: COMMERCIAL

## 2024-04-08 DIAGNOSIS — G56.01 CARPAL TUNNEL SYNDROME OF RIGHT WRIST: ICD-10-CM

## 2024-04-08 DIAGNOSIS — G56.02 CARPAL TUNNEL SYNDROME OF LEFT WRIST: Primary | ICD-10-CM

## 2024-04-08 PROBLEM — G56.00 CTS (CARPAL TUNNEL SYNDROME): Status: ACTIVE | Noted: 2023-10-10

## 2024-04-08 PROCEDURE — 99024 POSTOP FOLLOW-UP VISIT: CPT | Performed by: ORTHOPAEDIC SURGERY

## 2024-04-08 NOTE — PROGRESS NOTES
18 y.o. male presents today for for follow up after left endoscopic carpal tunnel release and right carpal tunnel steroid injection. The patient reports doing well, night pain resolving. The DOS was 2 weeks ago. Pain is controlled. Patient denies numbness and tingling except some on the radial aspect of his ring finger.    Review of Systems    Constitutional: see HPI, no fever, no chills, not feeling tired, no significant weight gain or weight loss.   Eyes: No vision changes  ENT: no nosebleeds.   Cardiovascular: no chest pain.   Respiratory: no shortness of breath and no cough.   Gastrointestinal: no abdominal pain, no nausea, no vomiting and no diarrhea.   Musculoskeletal: per HPI  Neurological: no headache, no gait disturbances  Psychiatric: no depression and no sleep disturbances.   Endocrine: no muscle weakness and no muscle cramps.   Hematologic/Lymphatic: no swollen glands and no tendency for easy bruising or excessive swelling.     Patient's past medical history, past surgical history, allergies, and medications have been reviewed unless otherwise noted in the chart.     Hand/Wrist Post-Op Exam  Inspection:  no evidence of infection, no erythema, Palpation:  compartments are soft, Range of Motion:  F/E 80/80, S/P 90/90 Finger ROM Full extension, full flexion Stability:  stable Strength:  5/5 F/E, 5/5 Adduction/Abduction 5/5 Opposition Skin:  incision site clean, dry and intact, healing without complication, Vascular:  capillary refill <2 seconds distally, Sensation:  intact to light touch distally, except decreased beyond PIP joint radial aspect ring finger    Constitutional   General appearance: Alert and in no acute distress. Well developed, well nourished.    Eyes   External Eye, Conjunctiva and lids: Normal external exam - pupils were equal in size, round, reactive to light (PERRL) with normal accommodation and extraocular movements intact (EOMI).   Ears, Nose, Mouth, and Throat   Hearing: Normal.   Neck    Neck: No neck mass was observed. Supple.   Pulmonary   Respiratory effort: No respiratory distress.   Cardiovascular   Examination of extremities: No peripheral edema.   Psychiatric   Judgment and insight: Intact.   Orientation to person, place, and time: Alert and oriented x 3.       Mood and affect: Normal.      2 weeks status post left endoscopic carpal tunnel release and right carpal tunnel injection  Based on the history, physical exam and imaging studies above, the patient's presentation is consistent with the above diagnosis.  I had a long discussion with the patient regarding their presentation and the treatment options.    We again discussed her treatment options going forward along with their associated risks and benefits. After thorough discussion, the patient has elected to proceed with postoperative care. All questions were answered to the patients satisfaction who seems satisfied with the plan.  They will call the office with any questions/concerns.     Sutures removed  Steris  Vitamin E cream prn to scar tissue  Activities as tolerated  FU 3 months - no XR

## 2024-04-08 NOTE — LETTER
April 8, 2024     Patient: Rio Stone   YOB: 2005   Date of Visit: 4/8/2024       To Whom It May Concern:    It is my medical opinion that Rio Stone may return to work on 04/10/2024 with no restrictions .    If you have any questions or concerns, please don't hesitate to call.         Sincerely,        Smith Moore,     CC: No Recipients

## 2024-07-08 ENCOUNTER — APPOINTMENT (OUTPATIENT)
Dept: ORTHOPEDIC SURGERY | Facility: CLINIC | Age: 19
End: 2024-07-08
Payer: COMMERCIAL

## 2024-08-26 ENCOUNTER — APPOINTMENT (OUTPATIENT)
Dept: ORTHOPEDIC SURGERY | Facility: CLINIC | Age: 19
End: 2024-08-26

## 2024-10-24 ENCOUNTER — APPOINTMENT (OUTPATIENT)
Dept: ORTHOPEDIC SURGERY | Facility: HOSPITAL | Age: 19
End: 2024-10-24

## 2024-10-24 DIAGNOSIS — G56.02 CARPAL TUNNEL SYNDROME OF LEFT WRIST: Primary | ICD-10-CM

## 2024-10-24 DIAGNOSIS — G56.01 CARPAL TUNNEL SYNDROME OF RIGHT WRIST: ICD-10-CM

## 2024-10-24 PROCEDURE — 99214 OFFICE O/P EST MOD 30 MIN: CPT | Performed by: ORTHOPAEDIC SURGERY

## 2024-10-24 NOTE — PROGRESS NOTES
19 y.o. male presents today for for follow up after left endoscopic carpal tunnel release and right carpal tunnel steroid injection. The patient reports doing well, night pain resolving. The DOS was 8 months ago. Pain is controlled. Patient denies numbness and tingling.  States right side does give him some issues at times now.  States that shot did help.    Review of Systems    Constitutional: see HPI, no fever, no chills, not feeling tired, no significant weight gain or weight loss.   Eyes: No vision changes  ENT: no nosebleeds.   Cardiovascular: no chest pain.   Respiratory: no shortness of breath and no cough.   Gastrointestinal: no abdominal pain, no nausea, no vomiting and no diarrhea.   Musculoskeletal: per HPI  Neurological: no headache, no gait disturbances  Psychiatric: no depression and no sleep disturbances.   Endocrine: no muscle weakness and no muscle cramps.   Hematologic/Lymphatic: no swollen glands and no tendency for easy bruising or excessive swelling.     Patient's past medical history, past surgical history, allergies, and medications have been reviewed unless otherwise noted in the chart.     Hand/Wrist Post-Op Exam  Inspection:  no evidence of infection, no erythema, Palpation:  compartments are soft, Range of Motion:  F/E 80/80, S/P 90/90 Finger ROM Full extension, full flexion Stability:  stable Strength:  5/5 F/E, 5/5 Adduction/Abduction 5/5 Opposition Skin:  incision site clean, dry and intact, healing without complication, Vascular:  capillary refill <2 seconds distally, Sensation:  intact to light touch distally, except decreased beyond PIP joint radial aspect ring finger    Carpal Tunnel Exam  Inspection:  no evidence of infection, no edema, no erythema, no ecchymosis, Palpation:  compartments are soft, no pain with palpation, Range of Motion:  full wrist and finger range of motion, Stability:  no wrist instability detected, Strength:  5/5 APB and intrinsics, Skin:  intact, Vascular:   capillary refill <2 seconds distally, Sensation:  decreased in the median nerve distribution, Test:  negative Tinel's at the Carpal Tunnel, positive Direct Carpal Tunnel Compression Test      Constitutional   General appearance: Alert and in no acute distress. Well developed, well nourished.    Eyes   External Eye, Conjunctiva and lids: Normal external exam - pupils were equal in size, round, reactive to light (PERRL) with normal accommodation and extraocular movements intact (EOMI).   Ears, Nose, Mouth, and Throat   Hearing: Normal.   Neck   Neck: No neck mass was observed. Supple.   Pulmonary   Respiratory effort: No respiratory distress.   Cardiovascular   Examination of extremities: No peripheral edema.   Psychiatric   Judgment and insight: Intact.   Orientation to person, place, and time: Alert and oriented x 3.       Mood and affect: Normal.      8 months status post left endoscopic carpal tunnel release and right carpal tunnel injection  Based on the history, physical exam and imaging studies above, the patient's presentation is consistent with the above diagnosis.  I had a long discussion with the patient regarding their presentation and the treatment options.    We again discussed her treatment options going forward along with their associated risks and benefits. After thorough discussion, the patient has elected to proceed with postoperative care. All questions were answered to the patients satisfaction who seems satisfied with the plan.  They will call the office with any questions/concerns.     Vitamin E cream prn to scar tissue  Activities as tolerated  Night splint right  FU 3 months prn- no XR

## 2024-10-24 NOTE — PROGRESS NOTES
Follow up left endoscopic carpal tunnel release and right carpal injection done on 3/26/2024, patient has no concerns.

## 2025-04-20 ENCOUNTER — APPOINTMENT (OUTPATIENT)
Dept: RADIOLOGY | Facility: HOSPITAL | Age: 20
End: 2025-04-20
Payer: COMMERCIAL

## 2025-04-20 ENCOUNTER — HOSPITAL ENCOUNTER (EMERGENCY)
Facility: HOSPITAL | Age: 20
Discharge: HOME | End: 2025-04-20
Attending: STUDENT IN AN ORGANIZED HEALTH CARE EDUCATION/TRAINING PROGRAM
Payer: COMMERCIAL

## 2025-04-20 VITALS
OXYGEN SATURATION: 96 % | WEIGHT: 250 LBS | DIASTOLIC BLOOD PRESSURE: 82 MMHG | HEART RATE: 89 BPM | BODY MASS INDEX: 33.86 KG/M2 | HEIGHT: 72 IN | TEMPERATURE: 97.7 F | RESPIRATION RATE: 18 BRPM | SYSTOLIC BLOOD PRESSURE: 147 MMHG

## 2025-04-20 DIAGNOSIS — R10.9 ABDOMINAL PAIN, UNSPECIFIED ABDOMINAL LOCATION: Primary | ICD-10-CM

## 2025-04-20 LAB
ALBUMIN SERPL BCP-MCNC: 4.3 G/DL (ref 3.4–5)
ALP SERPL-CCNC: 96 U/L (ref 33–120)
ALT SERPL W P-5'-P-CCNC: 21 U/L (ref 10–52)
ANION GAP SERPL CALC-SCNC: 16 MMOL/L (ref 10–20)
APPEARANCE UR: ABNORMAL
AST SERPL W P-5'-P-CCNC: 20 U/L (ref 9–39)
BASOPHILS # BLD AUTO: 0.09 X10*3/UL (ref 0–0.1)
BASOPHILS NFR BLD AUTO: 0.8 %
BILIRUB SERPL-MCNC: 0.6 MG/DL (ref 0–1.2)
BILIRUB UR STRIP.AUTO-MCNC: NEGATIVE MG/DL
BUN SERPL-MCNC: 14 MG/DL (ref 6–23)
CALCIUM SERPL-MCNC: 9 MG/DL (ref 8.6–10.3)
CHLORIDE SERPL-SCNC: 104 MMOL/L (ref 98–107)
CO2 SERPL-SCNC: 24 MMOL/L (ref 21–32)
COLOR UR: ABNORMAL
CREAT SERPL-MCNC: 0.97 MG/DL (ref 0.5–1.3)
EGFRCR SERPLBLD CKD-EPI 2021: >90 ML/MIN/1.73M*2
EOSINOPHIL # BLD AUTO: 0.41 X10*3/UL (ref 0–0.7)
EOSINOPHIL NFR BLD AUTO: 3.7 %
ERYTHROCYTE [DISTWIDTH] IN BLOOD BY AUTOMATED COUNT: 12.1 % (ref 11.5–14.5)
GLUCOSE SERPL-MCNC: 119 MG/DL (ref 74–99)
GLUCOSE UR STRIP.AUTO-MCNC: NORMAL MG/DL
HCT VFR BLD AUTO: 44 % (ref 41–52)
HGB BLD-MCNC: 14.7 G/DL (ref 13.5–17.5)
IMM GRANULOCYTES # BLD AUTO: 0.03 X10*3/UL (ref 0–0.7)
IMM GRANULOCYTES NFR BLD AUTO: 0.3 % (ref 0–0.9)
KETONES UR STRIP.AUTO-MCNC: NEGATIVE MG/DL
LACTATE SERPL-SCNC: 1.9 MMOL/L (ref 0.4–2)
LEUKOCYTE ESTERASE UR QL STRIP.AUTO: NEGATIVE
LIPASE SERPL-CCNC: 46 U/L (ref 9–82)
LYMPHOCYTES # BLD AUTO: 3.47 X10*3/UL (ref 1.2–4.8)
LYMPHOCYTES NFR BLD AUTO: 31.3 %
MAGNESIUM SERPL-MCNC: 2.18 MG/DL (ref 1.6–2.4)
MCH RBC QN AUTO: 29.8 PG (ref 26–34)
MCHC RBC AUTO-ENTMCNC: 33.4 G/DL (ref 32–36)
MCV RBC AUTO: 89 FL (ref 80–100)
MONOCYTES # BLD AUTO: 0.72 X10*3/UL (ref 0.1–1)
MONOCYTES NFR BLD AUTO: 6.5 %
NEUTROPHILS # BLD AUTO: 6.37 X10*3/UL (ref 1.2–7.7)
NEUTROPHILS NFR BLD AUTO: 57.4 %
NITRITE UR QL STRIP.AUTO: NEGATIVE
NRBC BLD-RTO: 0 /100 WBCS (ref 0–0)
PH UR STRIP.AUTO: 7.5 [PH]
PLATELET # BLD AUTO: 345 X10*3/UL (ref 150–450)
POTASSIUM SERPL-SCNC: 3.9 MMOL/L (ref 3.5–5.3)
PROT SERPL-MCNC: 7 G/DL (ref 6.4–8.2)
PROT UR STRIP.AUTO-MCNC: NEGATIVE MG/DL
RBC # BLD AUTO: 4.93 X10*6/UL (ref 4.5–5.9)
RBC # UR STRIP.AUTO: NEGATIVE MG/DL
SODIUM SERPL-SCNC: 140 MMOL/L (ref 136–145)
SP GR UR STRIP.AUTO: 1.02
UROBILINOGEN UR STRIP.AUTO-MCNC: NORMAL MG/DL
WBC # BLD AUTO: 11.1 X10*3/UL (ref 4.4–11.3)

## 2025-04-20 PROCEDURE — 83605 ASSAY OF LACTIC ACID: CPT | Performed by: STUDENT IN AN ORGANIZED HEALTH CARE EDUCATION/TRAINING PROGRAM

## 2025-04-20 PROCEDURE — 74177 CT ABD & PELVIS W/CONTRAST: CPT

## 2025-04-20 PROCEDURE — 36415 COLL VENOUS BLD VENIPUNCTURE: CPT | Performed by: STUDENT IN AN ORGANIZED HEALTH CARE EDUCATION/TRAINING PROGRAM

## 2025-04-20 PROCEDURE — 74177 CT ABD & PELVIS W/CONTRAST: CPT | Performed by: INTERNAL MEDICINE

## 2025-04-20 PROCEDURE — 99285 EMERGENCY DEPT VISIT HI MDM: CPT | Mod: 25 | Performed by: STUDENT IN AN ORGANIZED HEALTH CARE EDUCATION/TRAINING PROGRAM

## 2025-04-20 PROCEDURE — 83690 ASSAY OF LIPASE: CPT | Performed by: STUDENT IN AN ORGANIZED HEALTH CARE EDUCATION/TRAINING PROGRAM

## 2025-04-20 PROCEDURE — 80053 COMPREHEN METABOLIC PANEL: CPT | Performed by: STUDENT IN AN ORGANIZED HEALTH CARE EDUCATION/TRAINING PROGRAM

## 2025-04-20 PROCEDURE — 85025 COMPLETE CBC W/AUTO DIFF WBC: CPT | Performed by: STUDENT IN AN ORGANIZED HEALTH CARE EDUCATION/TRAINING PROGRAM

## 2025-04-20 PROCEDURE — 81003 URINALYSIS AUTO W/O SCOPE: CPT | Performed by: STUDENT IN AN ORGANIZED HEALTH CARE EDUCATION/TRAINING PROGRAM

## 2025-04-20 PROCEDURE — 2550000001 HC RX 255 CONTRASTS: Mod: JZ | Performed by: STUDENT IN AN ORGANIZED HEALTH CARE EDUCATION/TRAINING PROGRAM

## 2025-04-20 PROCEDURE — 83735 ASSAY OF MAGNESIUM: CPT | Performed by: STUDENT IN AN ORGANIZED HEALTH CARE EDUCATION/TRAINING PROGRAM

## 2025-04-20 RX ADMIN — IOHEXOL 75 ML: 350 INJECTION, SOLUTION INTRAVENOUS at 19:53

## 2025-04-20 ASSESSMENT — PAIN DESCRIPTION - PAIN TYPE: TYPE: ACUTE PAIN

## 2025-04-20 ASSESSMENT — PAIN SCALES - GENERAL: PAINLEVEL_OUTOF10: 7

## 2025-04-20 ASSESSMENT — PAIN DESCRIPTION - LOCATION: LOCATION: ABDOMEN

## 2025-04-20 ASSESSMENT — COLUMBIA-SUICIDE SEVERITY RATING SCALE - C-SSRS
2. HAVE YOU ACTUALLY HAD ANY THOUGHTS OF KILLING YOURSELF?: NO
6. HAVE YOU EVER DONE ANYTHING, STARTED TO DO ANYTHING, OR PREPARED TO DO ANYTHING TO END YOUR LIFE?: NO
1. IN THE PAST MONTH, HAVE YOU WISHED YOU WERE DEAD OR WISHED YOU COULD GO TO SLEEP AND NOT WAKE UP?: NO

## 2025-04-20 ASSESSMENT — LIFESTYLE VARIABLES
EVER FELT BAD OR GUILTY ABOUT YOUR DRINKING: NO
HAVE YOU EVER FELT YOU SHOULD CUT DOWN ON YOUR DRINKING: NO
HAVE PEOPLE ANNOYED YOU BY CRITICIZING YOUR DRINKING: NO
EVER HAD A DRINK FIRST THING IN THE MORNING TO STEADY YOUR NERVES TO GET RID OF A HANGOVER: NO
TOTAL SCORE: 0

## 2025-04-20 ASSESSMENT — PAIN - FUNCTIONAL ASSESSMENT: PAIN_FUNCTIONAL_ASSESSMENT: 0-10

## 2025-04-20 ASSESSMENT — PAIN DESCRIPTION - ORIENTATION: ORIENTATION: LEFT;LOWER

## 2025-04-20 NOTE — ED TRIAGE NOTES
"Pt to ED via POV c/o LLQ abdominal pain that started this morning. States felt \"pop\"Pain 7/10 non radiating. Denies changes in bowel/bladder habits. Denies N/V/D/CP/SOB. A&Ox4, GCS 15   "

## 2025-04-20 NOTE — ED PROVIDER NOTES
HPI   Chief Complaint   Patient presents with    Abdominal Pain     LLQ       This is a 19-year-old male who presents emerged part for left lower quadrant Jeffery pain that started yesterday he says he was moving a large stool when he felt a pop.  Denies any nausea, vomiting, no diarrhea, no constipation no pain with urination no other issues otherwise.  Please tell patient to take Tylenol.                          Foster Coma Scale Score: 15                  Patient History   Medical History[1]  Surgical History[2]  Family History[3]  Social History[4]    Physical Exam   ED Triage Vitals [04/20/25 1708]   Temperature Heart Rate Respirations BP   36.5 °C (97.7 °F) 89 18 147/82      Pulse Ox Temp Source Heart Rate Source Patient Position   96 % Tympanic Monitor --      BP Location FiO2 (%)     -- --       Physical Exam  Constitutional:       General: He is not in acute distress.  HENT:      Head: Normocephalic and atraumatic.      Right Ear: Tympanic membrane normal.      Left Ear: Tympanic membrane normal.      Mouth/Throat:      Mouth: Mucous membranes are moist.   Eyes:      Extraocular Movements: Extraocular movements intact.      Conjunctiva/sclera: Conjunctivae normal.      Pupils: Pupils are equal, round, and reactive to light.   Cardiovascular:      Rate and Rhythm: Normal rate and regular rhythm.      Heart sounds: No murmur heard.  Pulmonary:      Effort: Pulmonary effort is normal. No respiratory distress.      Breath sounds: Normal breath sounds. No stridor. No wheezing or rales.   Abdominal:      General: Bowel sounds are normal. There is no distension.      Tenderness: There is abdominal tenderness in the left upper quadrant and left lower quadrant. There is no guarding or rebound.   Musculoskeletal:         General: No swelling, tenderness or deformity. Normal range of motion.   Skin:     General: Skin is warm and dry.      Coloration: Skin is not jaundiced.      Findings: No bruising or lesion.    Neurological:      General: No focal deficit present.      Mental Status: He is alert and oriented to person, place, and time. Mental status is at baseline.      Cranial Nerves: No cranial nerve deficit.      Motor: No weakness.   Psychiatric:         Mood and Affect: Mood normal.       Labs Reviewed - No data to display  No orders to display       ED Course & MDM   ED Course as of 25   Sun  IMPRESSION:  No acute abdominopelvic abnormality to explain patient's pain. Mild  mosaic attenuation of the bilateral lungs, suspicious for small  vessel/airway disease.   [CF]      ED Course User Index  [CF] Dena Wang MD       Medical Decision Making  This is a 19-year-old male who presents emergency department for left lower abdominal pain that started yesterday after he felt a pop.  Here he has no leukocytosis or left shift.  Normal LFTs and lipase is negative less likely biliary or pancreatitis.  His urine is benign.  Negative no signs of a kidney stone on CT.  CT shows no intra-abdominal pathology.  At this time patient safe for discharge home and can follow-up with his primary care provider.        Procedure  Procedures       [1]   Past Medical History:  Diagnosis Date    Apnea, not elsewhere classified     Apnea    Bipolar disorder, current episode manic without psychotic features, unspecified (Multi)     Bipolar disorder, current episode manic without psychotic features    CTS (carpal tunnel syndrome)     Dysuria 2014    Dysuria    Other conditions influencing health status      Bradycardia    Personal history of diseases of the skin and subcutaneous tissue 2013    History of dermatitis    Personal history of other infectious and parasitic diseases 2012    History of scabies    Personal history of traumatic brain injury 2015    History of concussion   [2]   Past Surgical History:  Procedure Laterality Date    CARPAL TUNNEL RELEASE Left 2024     CIRCUMCISION, PRIMARY  09/20/2012    Elective Circumcision   [3]   Family History  Problem Relation Name Age of Onset    Allergies Mother      Anemia Mother      Asthma Mother      ADD / ADHD Mother      Epilepsy Mother      Migraines Mother      Other (overweight) Mother      Other (thyroid disorder) Father      Other (overweight) Father      Other (esophageal reflux) Father      Cancer Maternal Grandmother      Diabetes Maternal Grandmother      Heart disease Maternal Grandmother      Hypertension Maternal Grandmother      Other (overweight) Maternal Grandmother      Other (pure hypercholesterolemia) Maternal Grandmother      Thrombocytopenia Maternal Grandmother     [4]   Social History  Tobacco Use    Smoking status: Never    Smokeless tobacco: Current     Types: Chew   Vaping Use    Vaping status: Never Used   Substance Use Topics    Alcohol use: Never    Drug use: Never        Dena Wang MD  04/20/25 2031

## 2025-04-21 LAB — HOLD SPECIMEN: NORMAL

## 2025-04-30 ENCOUNTER — APPOINTMENT (OUTPATIENT)
Dept: PRIMARY CARE | Facility: CLINIC | Age: 20
End: 2025-04-30
Payer: COMMERCIAL

## 2025-04-30 VITALS
HEIGHT: 72 IN | DIASTOLIC BLOOD PRESSURE: 77 MMHG | WEIGHT: 284 LBS | SYSTOLIC BLOOD PRESSURE: 132 MMHG | HEART RATE: 68 BPM | BODY MASS INDEX: 38.47 KG/M2 | RESPIRATION RATE: 12 BRPM

## 2025-04-30 DIAGNOSIS — Z00.00 ROUTINE GENERAL MEDICAL EXAMINATION AT A HEALTH CARE FACILITY: Primary | ICD-10-CM

## 2025-04-30 PROBLEM — S40.012A CONTUSION OF LEFT SHOULDER: Status: RESOLVED | Noted: 2023-10-10 | Resolved: 2025-04-30

## 2025-04-30 PROBLEM — L85.8 OTHER SPECIFIED EPIDERMAL THICKENING: Status: RESOLVED | Noted: 2018-01-24 | Resolved: 2025-04-30

## 2025-04-30 PROBLEM — F81.9 LEARNING DIFFICULTY: Status: RESOLVED | Noted: 2023-10-10 | Resolved: 2025-04-30

## 2025-04-30 PROBLEM — L30.9 ECZEMA: Status: RESOLVED | Noted: 2023-10-10 | Resolved: 2025-04-30
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
## 2025-04-30 PROBLEM — R41.840 POOR CONCENTRATION: Status: RESOLVED | Noted: 2023-10-10 | Resolved: 2025-04-30

## 2025-04-30 PROBLEM — G43.009 MIGRAINE WITHOUT AURA AND WITHOUT STATUS MIGRAINOSUS, NOT INTRACTABLE: Status: RESOLVED | Noted: 2023-10-10 | Resolved: 2025-04-30

## 2025-04-30 PROBLEM — T50.905A MEDICATION REACTION: Status: RESOLVED | Noted: 2023-10-10 | Resolved: 2025-04-30

## 2025-04-30 PROCEDURE — 90715 TDAP VACCINE 7 YRS/> IM: CPT | Performed by: FAMILY MEDICINE

## 2025-04-30 PROCEDURE — 99385 PREV VISIT NEW AGE 18-39: CPT | Performed by: FAMILY MEDICINE

## 2025-04-30 PROCEDURE — 90471 IMMUNIZATION ADMIN: CPT | Performed by: FAMILY MEDICINE

## 2025-04-30 PROCEDURE — 3008F BODY MASS INDEX DOCD: CPT | Performed by: FAMILY MEDICINE

## 2025-04-30 ASSESSMENT — PATIENT HEALTH QUESTIONNAIRE - PHQ9
2. FEELING DOWN, DEPRESSED OR HOPELESS: NOT AT ALL
SUM OF ALL RESPONSES TO PHQ9 QUESTIONS 1 AND 2: 0
1. LITTLE INTEREST OR PLEASURE IN DOING THINGS: NOT AT ALL

## 2025-04-30 NOTE — PROGRESS NOTES
pt has regular dental visits. no vision problems. no hearing loss.   Lifestyle: healthy diet. no weight concerns. Pt exercises regularly.   He does use tobacco. He denies alcohol abuse.   Reproductive health: the patient is sexually active.  safe sexual behavior   Safety elements used:   smoke detector at home.   no passive smoke exposure,  chemical abuse, domestic violence, anxiety symptoms, depression symptoms, pt has safe driving habits,  no tuberculosis exposure.      Review of Systems  At least 10/14 systems were reviewed and were negative   Physical Exam  Constitutional: Alert and in no acute distress. Well developed, well nourished.   Head and Face: Normal.    Eyes: Normal external exam. Pupils: normal size and EOMI. No sclera icterus  Ears, Nose, Mouth, and Throat: External inspection of ears and nose: Normal.  Hearing: Normal.  Nasal mucosa: Normal.  Oropharynx: Normal.    Neck: Supple. No neck mass was observed. Thyroid not enlarged  Cardiovascular: Heart rate and rhythm were normal. Pedal pulses: Normal. No peripheral edema.   Pulmonary: No respiratory distress. Clear bilateral breath sounds.   Chest wall: Normal.    Abdomen: Soft,  nontender; no abdominal mass palpated. No organomegaly. Normal BS.  Musculoskeletal: Gait and station: Normal. No joint effusion, Muscle strength/tone: Normal.    Skin: Normal skin color and pigmentation, normal skin turgor,  no rash.   Neurologic: Cranial nerves 2-12 grossly intact.  Sensation: Normal.   Psychiatric: Judgment and insight: Intact. Alert and oriented x 3. Recent and remote memory: Normal.  Mood and affect: Normal.   Lymphatic: No cervical lymphadenopathy.     Unremarkable PE except  obesity. recommend nutritionist eval. Recommend DASH diet and regular exercise. Pt declined labs for now.  Advise eye exam by an OD yearly and dental exam every 6 months. will monitor lipid and weight yearly. Recommend safe driving. Recommend sunscreen application if exposed to the  sun when UV index is above 2.  recommend Hep C, hep B, HIV test. recommend  vaccines as suggested in the  [employer]pic care gaps. We will call pt regarding lab results. f/u as scheduled. DC chewing tobacco.    Has patient received Tdap or Td within last 10 years? N  Is patient 65 years old or older? N  Does patient have elevated temperature or feel sick today? N  Has patient had an allergy, severe reaction or illness from any vaccine? N  Does patient have an allergy to latex? N  Does patient have a progressive or unstable neurologic disorder, uncontrolled seizures or progressive encephalopathy? N

## (undated) DEVICE — CUFF, TOURNIQUET, 18 X 4, DUAL PORT/SNGL BLADDER, DISP, LF

## (undated) DEVICE — BLADE, SMARTRELEASE, ONYX, CARPAL TUNNEL, DISP

## (undated) DEVICE — APPLICATOR, CHLORAPREP, W/ORANGE TINT, 26ML

## (undated) DEVICE — BANDAGE, ELASTIC, PREMIUM, SELF-CLOSE, 2 IN X 5 YD, STERILE

## (undated) DEVICE — COVER HANDLE LIGHT, STERIS, BLUE, STERILE

## (undated) DEVICE — SYRINGE, 10 CC, LUER LOCK

## (undated) DEVICE — DRESSING, GAUZE, PETROLATUM, STRIP, XEROFORM, 1 X 8 IN, STERILE

## (undated) DEVICE — PADDING, CAST, SOFT, 2 X 4YDS

## (undated) DEVICE — SUTURE, ETHILON, 4-0, BLK, MONO, PS-2 18

## (undated) DEVICE — TOWEL PACK, STERILE, 4/PACK, BLUE

## (undated) DEVICE — GLOVE, PROTEXIS PI CLASSIC, SZ-8.0, PF, PF, LF

## (undated) DEVICE — SOLUTION, IRRIGATION, SODIUM CHLORIDE 0.9%, 1000 ML, POUR BOTTLE